# Patient Record
Sex: FEMALE | Race: BLACK OR AFRICAN AMERICAN | Employment: FULL TIME | URBAN - METROPOLITAN AREA
[De-identification: names, ages, dates, MRNs, and addresses within clinical notes are randomized per-mention and may not be internally consistent; named-entity substitution may affect disease eponyms.]

---

## 2024-07-23 ENCOUNTER — APPOINTMENT (OUTPATIENT)
Dept: GENERAL RADIOLOGY | Age: 64
End: 2024-07-23
Payer: COMMERCIAL

## 2024-07-23 ENCOUNTER — HOSPITAL ENCOUNTER (EMERGENCY)
Age: 64
Discharge: HOME | End: 2024-07-23
Payer: COMMERCIAL

## 2024-07-23 VITALS
DIASTOLIC BLOOD PRESSURE: 77 MMHG | OXYGEN SATURATION: 99 % | SYSTOLIC BLOOD PRESSURE: 137 MMHG | WEIGHT: 189 LBS | HEART RATE: 87 BPM | RESPIRATION RATE: 16 BRPM | HEIGHT: 63 IN | TEMPERATURE: 98.6 F | BODY MASS INDEX: 33.49 KG/M2

## 2024-07-23 DIAGNOSIS — M25.442 FINGER JOINT SWELLING, LEFT: Primary | ICD-10-CM

## 2024-07-23 PROCEDURE — 6360000002 HC RX W HCPCS: Performed by: PHYSICIAN ASSISTANT

## 2024-07-23 PROCEDURE — 99284 EMERGENCY DEPT VISIT MOD MDM: CPT

## 2024-07-23 PROCEDURE — 96372 THER/PROPH/DIAG INJ SC/IM: CPT

## 2024-07-23 PROCEDURE — 73140 X-RAY EXAM OF FINGER(S): CPT | Performed by: RADIOLOGY

## 2024-07-23 PROCEDURE — 73140 X-RAY EXAM OF FINGER(S): CPT

## 2024-07-23 RX ORDER — KETOROLAC TROMETHAMINE 30 MG/ML
30 INJECTION, SOLUTION INTRAMUSCULAR; INTRAVENOUS ONCE
Status: COMPLETED | OUTPATIENT
Start: 2024-07-23 | End: 2024-07-23

## 2024-07-23 RX ORDER — METHYLPREDNISOLONE 4 MG/1
TABLET ORAL
Qty: 1 KIT | Refills: 0 | Status: SHIPPED | OUTPATIENT
Start: 2024-07-23 | End: 2024-07-29

## 2024-07-23 RX ADMIN — KETOROLAC TROMETHAMINE 30 MG: 30 INJECTION, SOLUTION INTRAMUSCULAR at 20:24

## 2024-07-23 NOTE — ED TRIAGE NOTES
Pt c/o swelling to L 4th digit. Woke up with swelling on Saturday, has not gotten better. Denies any injury. Does c/o itching, possible bite? Decreased ROM.

## 2024-07-24 NOTE — ED PROVIDER NOTES
Emergency Department Provider Note       PCP: Gladys Freire MD   Age: 63 y.o.   Sex: female     DISPOSITION Decision To Discharge 07/23/2024 09:32:07 PM       ICD-10-CM    1. Finger joint swelling, left  M25.442           Medical Decision Making     Patient is a 63-year-old female presenting with pain and swelling to the left fourth digit.  No trauma or injury started 3 days ago but significantly worse over the last 24 hours.  She has pain and swelling at the PIP joint on the left first digit no redness or warmth to touch.  She is afebrile nontoxic in appearance.  X-ray imaging performed which did not show any acute findings.  She was given injection of Toradol and does report improvement in pain on reevaluation.  Suspect inflammatory process therefore will DC with Medrol Dosepak.  Discussed follow-up as well as reasons to return immediately to the ER.  Patient verbalized understanding and is agreeable to plan.     1 acute, uncomplicated illness or injury.  Over the counter drug management performed.  Prescription drug management performed.  Shared medical decision making was utilized in creating the patients health plan today.    I independently ordered and reviewed each unique test.       I interpreted the X-rays x-ray of the left hand without any fracture.              History     Patient is a 63-year-old female presents with complaint of swelling and pain to the left fourth digit.  No trauma or injury to the her knowledge.  She first noticed that it looked a little swollen 3 days ago however the last 24 hours has gotten significantly more painful.  Especially to touch.  She has pain and swelling to the PIP joint on the left first digit.  No fevers or chills.  No redness to the area.  She tried taking some Tylenol arthritis last night before going to bed did not feel like it did much to help her pain.  She has not taken anything today.  She rates the pain as a 10.     The history is provided by the

## 2024-07-24 NOTE — DISCHARGE INSTRUCTIONS
Your x-rays did not show any concerning findings.  I am sending you home with a tapering dose of steroids to help with inflammation in your finger.  Do recommend close follow-up with your doctor within the week for reevaluation of your symptoms.  Return immediately to the ER with any severely worsening pain, redness spreading up to your hand fevers or concerning symptoms.